# Patient Record
Sex: FEMALE | Race: WHITE | NOT HISPANIC OR LATINO | URBAN - METROPOLITAN AREA
[De-identification: names, ages, dates, MRNs, and addresses within clinical notes are randomized per-mention and may not be internally consistent; named-entity substitution may affect disease eponyms.]

---

## 2018-08-28 ENCOUNTER — OFFICE VISIT CONVERTED (OUTPATIENT)
Dept: OTHER | Facility: HOSPITAL | Age: 33
End: 2018-08-28
Attending: INTERNAL MEDICINE

## 2020-11-23 ENCOUNTER — HOSPITAL ENCOUNTER (OUTPATIENT)
Dept: OTHER | Facility: HOSPITAL | Age: 35
Discharge: HOME | End: 2020-11-23

## 2020-11-23 LAB — HCG INTACT+B SERPL-ACNC: 16.3 M[IU]/ML (ref 0–5)

## 2020-11-25 ENCOUNTER — HOSPITAL ENCOUNTER (OUTPATIENT)
Dept: OTHER | Facility: HOSPITAL | Age: 35
Discharge: HOME | End: 2020-11-25

## 2020-11-25 LAB — HCG INTACT+B SERPL-ACNC: 15.7 M[IU]/ML (ref 0–5)

## 2020-11-27 ENCOUNTER — HOSPITAL ENCOUNTER (OUTPATIENT)
Dept: OTHER | Facility: HOSPITAL | Age: 35
Discharge: HOME | End: 2020-11-27

## 2020-11-27 LAB — HCG INTACT+B SERPL-ACNC: 9.6 M[IU]/ML (ref 0–5)

## 2020-11-30 ENCOUNTER — HOSPITAL ENCOUNTER (OUTPATIENT)
Dept: OTHER | Facility: HOSPITAL | Age: 35
Discharge: HOME | End: 2020-11-30

## 2020-11-30 LAB — HCG INTACT+B SERPL-ACNC: 1.5 M[IU]/ML (ref 0–5)

## 2021-05-12 ENCOUNTER — HOSPITAL ENCOUNTER (OUTPATIENT)
Dept: OTHER | Facility: HOSPITAL | Age: 36
Discharge: HOME | End: 2021-05-12

## 2021-05-12 LAB — HCG INTACT+B SERPL-ACNC: 135.6 M[IU]/ML (ref 0–5)

## 2021-05-14 ENCOUNTER — HOSPITAL ENCOUNTER (OUTPATIENT)
Dept: OTHER | Facility: HOSPITAL | Age: 36
Discharge: HOME | End: 2021-05-14

## 2021-05-14 LAB — HCG INTACT+B SERPL-ACNC: 290.5 M[IU]/ML (ref 0–5)

## 2021-05-19 ENCOUNTER — HOSPITAL ENCOUNTER (OUTPATIENT)
Dept: OTHER | Facility: HOSPITAL | Age: 36
Discharge: HOME | End: 2021-05-19

## 2021-05-19 LAB — HCG INTACT+B SERPL-ACNC: 963.8 M[IU]/ML (ref 0–5)

## 2021-05-21 ENCOUNTER — HOSPITAL ENCOUNTER (OUTPATIENT)
Dept: OTHER | Facility: HOSPITAL | Age: 36
Discharge: HOME | End: 2021-05-21

## 2021-05-21 LAB — HCG INTACT+B SERPL-ACNC: 1690 M[IU]/ML (ref 0–5)

## 2021-05-28 VITALS
HEART RATE: 80 BPM | OXYGEN SATURATION: 100 % | DIASTOLIC BLOOD PRESSURE: 77 MMHG | HEIGHT: 66 IN | WEIGHT: 141.5 LBS | BODY MASS INDEX: 22.74 KG/M2 | SYSTOLIC BLOOD PRESSURE: 112 MMHG | RESPIRATION RATE: 20 BRPM | TEMPERATURE: 99.2 F

## 2021-05-28 NOTE — CONSULTS
Patient: SHELDON MARTÍNEZ     Acct: [id number]     Report: #[id number]  UNIT #: [id number]     : 1985    Encounter Date:2018  PRIMARY CARE:   ***Signed***  --------------------------------------------------------------------------------------------------------------------  Consult      DATE: 18      Primary Care Provider            Nevaeh Chu            Referring Physician      Referring Provider:  SELF,REFERRED PATIENT            Reason For Consult      New Patient Von Willebrand Disease            History of Present Illness      32-year-old white female comes in by the request of her midwife.  Patient is     still weeks pregnant and has von Willebrand's disease.            History revealed that the patient had been having constant epistaxis with minor     trauma at a young age .  Because of this she was referred to hematologist prior     to rhinoplasty for deformed nose.  Patient claimed that she has type I von     Willebrand disease.  She had Stimate test that was positive.  She had DDAVP i    ntranasally prior to her rhinoplasty without bleeding complications.  She also     gives herself DDAVP prior to egg retrieval.            In  she had delivered her child without DDAVP and she bled profusely and     actually had passed out.            Her present midwife wants to know if she can have aspirin since this is standard    for pregnancy and she wants to know when she is to get DDAVP.  She is afraid     that this will cause problems with her baby.            Past Medical/Surgical History             No Hypertension             No Diabetes Mellitus             No Heart Disease             No Blood Clots             No Cancer             No Lung Disease             No Kidney Disease             Other (von Willebrand's disease type I)            Pyschiatric History      No Depression, No Anxiety, No Panic Attacks, No Bipolar, No Other            Social History      Social History:  No  Tobacco Use, No Alcohol Use, No Recreational Drug use, No     Other            Family History      Hypertension (mother), Diabetes Mellitus (sister), Heart Disease; No Blood     Clots; Cancer (Father, paternal grandmother); No Lung Disease, No Kidney     Disease; Other (mother has similar problem  but not diagnosed as von Willebrand)            Allergies      Coded Allergies:             NO KNOWN DRUG ALLERGIES (Verified  Allergy, Unknown, 8/28/18)            Medications      Omega-3 Fatty Acids/Fish Oil (Fish Oil 1000 Mg) 1 Each Capsule      2 GM PO BID, #120 CAP 0 Refills         Reported         8/28/18       Prenatal Vit#96/Ferrous Fum/Fa (Prenatal Tablet*) 1 Tab Tablet      1 TAB PO QDAY, #30 TAB 0 Refills         Reported         8/28/18      Current Medications      Current Medications Reviewed 8/28/18            Pain Assessment      Description:  None            Review of Systems      Abnormal as noted below; all other systems have been reviewed and are negative.      General:  No Anxiety; Fatigue Scale:; No Pain Scale:, No Fever, No Other      HEENT:  No Dysphagia, No Hearing Changes, No Rhinorrhea, No Tinnitus, No Visual     Changes, No Nasal Congestion, No Epistaxis, No Other      Respiratory:  No Cough, No Shortness of Air, No Sputum Changes, No Wheezing, No     Hemoptysis, No Congestion, No Other      Cardiovascular:  No Chest Pain, No Pedal Edema, No Orthopnea, No Palpitations,     No Chest Pressure, No Dizziness, No Other      Gastrointestinal:  Nausea (12 Weeks Pregnant); No Vomiting, No Dysphagia, No     Constipation, No Diarrhea; Appetite Fair; No Appetite Poor, No Early Satiety, No    Other      Genitourinary:  No Nocturia, No Dysuria, No Other      Musculoskeletal:  No Joint Effusions, No Joint Tenderness, No Joint Stiffness,     No Myalgias, No Aches, No Pains, No Other      Endocrine:  No Heat Intolerance, No Cold Intolerance, No Fatigue, No Blood Sugar    Control, No Other      Hematologic:   Bleeding, Bruising; No Swollen Glands, No Other      Allergic/Immunologic:  No Hives, No Throat closing off, No Nasal drip, No Itchy     eyes, No Hay fever, No Other      Psychological:  No Anxiety, No Depression, No Other      Neurological:  No Headaches, No Dizziness, No Weakness, No Numbness, No Other      Skin:  No Rash, No Open Wounds, No Edema, No Other      Vitals:             Height 5 ft 6 in / 167.64 cm           Weight 141 lbs 8 oz / 64.715818 kg           BSA 1.73 m2           BMI 22.8 kg/m2           Temperature 99.2 F / 37.33 C - Oral           Pulse 80           Respirations 20           Blood Pressure 112/77 Sitting, Left Arm           Pulse Oximetry 100%, room air            Exam      Constitutional:  No acute distress, Conversant, Pleasant      Eyes:  Anicteric sclerae, Palpebral Conjunctivae (pink), MIGUEL      HENT:  Oropharynx clear; No Erythema; Buccal mucosae (pink)      Neck:  Supple, Full Range of Motion      Lungs:  Clear to Ausculation, Normal Respiratory Effort      Cardiovascular:  RRR; No Murmurs; Normal PMI; No Peripheral Edema      Abdomen:  Soft, NABS; No Tenderness      Skin:  Normal temperature, Normal tone, Normal texture, Other (no dermatosis)      Extremities:  No digital cyanosis, No digital ischemia, Normal gait, Other (no     limitation and range of motion no deformity)      Psychiatric:  Appropriate affect, Intact judgement, AAO x 3      Neurological:  Cranial Nerve II-XII; No Focal Sensory deficits      Lymphatic:  Neck            Impression/Problem List      Von Willebrand's disease type I per history.  Patient has used DDAVP in the past    with success prior to any procedure.            Plan      We will obtain her records from [place] if available            Recommend not to use aspirin or NSAIDs.        Since patient responds adequately to DDAVP, may be given after the beginning of     labor and as close to delivery as estimated.  Subsequent doses can be given as     needed  every 12 hours for 2-4 doses.  Please watch for hyponatremia.      Thank you very much for allowing me to participate in the care of your patient.            [place]bon Copy:      Copies To 2:   Patricia Prather ;            Patient Education:        von Willebrand Disease            PREVENTION      Hx Influenza Vaccination:  No      Influenza Vaccine Declined:  Yes      2 or More Falls Past Year?:  No      Fall Past Year with Injury?:  No      Hx Pneumococcal Vaccination:  No      Encouraged to follow-up with:  PCP regarding preventative exams.      Chart initiated by      Agustina Lanza MA                 Disclaimer: Converted document may not contain table formatting or lab diagrams. Please see [facility] for the authenticated document.